# Patient Record
Sex: MALE | Race: WHITE | Employment: UNEMPLOYED | URBAN - METROPOLITAN AREA
[De-identification: names, ages, dates, MRNs, and addresses within clinical notes are randomized per-mention and may not be internally consistent; named-entity substitution may affect disease eponyms.]

---

## 2024-11-22 ENCOUNTER — APPOINTMENT (OUTPATIENT)
Dept: GENERAL RADIOLOGY | Age: 29
End: 2024-11-22

## 2024-11-22 ENCOUNTER — HOSPITAL ENCOUNTER (EMERGENCY)
Age: 29
Discharge: HOME | End: 2024-11-22
Attending: EMERGENCY MEDICINE

## 2024-11-22 VITALS
OXYGEN SATURATION: 98 % | SYSTOLIC BLOOD PRESSURE: 145 MMHG | TEMPERATURE: 97.2 F | HEART RATE: 100 BPM | WEIGHT: 215 LBS | BODY MASS INDEX: 29.99 KG/M2 | RESPIRATION RATE: 16 BRPM | DIASTOLIC BLOOD PRESSURE: 96 MMHG

## 2024-11-22 DIAGNOSIS — R06.02 SHORTNESS OF BREATH: Primary | ICD-10-CM

## 2024-11-22 LAB
ALBUMIN SERPL-MCNC: 5.1 G/DL (ref 3.5–5.2)
ALP SERPL-CCNC: 133 U/L (ref 40–129)
ALT SERPL-CCNC: 49 U/L (ref 0–40)
ANION GAP SERPL CALCULATED.3IONS-SCNC: 12 MMOL/L (ref 7–16)
AST SERPL-CCNC: 31 U/L (ref 0–39)
BASOPHILS # BLD: 0.02 K/UL (ref 0–0.2)
BASOPHILS NFR BLD: 0 % (ref 0–2)
BILIRUB SERPL-MCNC: 0.7 MG/DL (ref 0–1.2)
BUN SERPL-MCNC: 7 MG/DL (ref 6–20)
CALCIUM SERPL-MCNC: 9.6 MG/DL (ref 8.6–10.2)
CHLORIDE SERPL-SCNC: 103 MMOL/L (ref 98–107)
CO2 SERPL-SCNC: 25 MMOL/L (ref 22–29)
CREAT SERPL-MCNC: 0.8 MG/DL (ref 0.7–1.2)
D-DIMER QUANTITATIVE: <200 NG/ML DDU (ref 0–230)
EOSINOPHIL # BLD: 0.04 K/UL (ref 0.05–0.5)
EOSINOPHILS RELATIVE PERCENT: 0 % (ref 0–6)
ERYTHROCYTE [DISTWIDTH] IN BLOOD BY AUTOMATED COUNT: 12 % (ref 11.5–15)
GFR, ESTIMATED: >90 ML/MIN/1.73M2
GLUCOSE SERPL-MCNC: 103 MG/DL (ref 74–99)
HCT VFR BLD AUTO: 46.7 % (ref 37–54)
HGB BLD-MCNC: 16.4 G/DL (ref 12.5–16.5)
IMM GRANULOCYTES # BLD AUTO: 0.08 K/UL (ref 0–0.58)
IMM GRANULOCYTES NFR BLD: 1 % (ref 0–5)
LYMPHOCYTES NFR BLD: 1.45 K/UL (ref 1.5–4)
LYMPHOCYTES RELATIVE PERCENT: 10 % (ref 20–42)
MCH RBC QN AUTO: 29.8 PG (ref 26–35)
MCHC RBC AUTO-ENTMCNC: 35.1 G/DL (ref 32–34.5)
MCV RBC AUTO: 84.8 FL (ref 80–99.9)
MONOCYTES NFR BLD: 0.78 K/UL (ref 0.1–0.95)
MONOCYTES NFR BLD: 5 % (ref 2–12)
NEUTROPHILS NFR BLD: 84 % (ref 43–80)
NEUTS SEG NFR BLD: 12.34 K/UL (ref 1.8–7.3)
PLATELET # BLD AUTO: 286 K/UL (ref 130–450)
PMV BLD AUTO: 10.6 FL (ref 7–12)
POTASSIUM SERPL-SCNC: 4 MMOL/L (ref 3.5–5)
PROT SERPL-MCNC: 7.9 G/DL (ref 6.4–8.3)
RBC # BLD AUTO: 5.51 M/UL (ref 3.8–5.8)
SARS-COV-2 RDRP RESP QL NAA+PROBE: NOT DETECTED
SODIUM SERPL-SCNC: 140 MMOL/L (ref 132–146)
SPECIMEN DESCRIPTION: NORMAL
TROPONIN I SERPL HS-MCNC: <6 NG/L (ref 0–11)
WBC OTHER # BLD: 14.7 K/UL (ref 4.5–11.5)

## 2024-11-22 PROCEDURE — 71046 X-RAY EXAM CHEST 2 VIEWS: CPT

## 2024-11-22 PROCEDURE — 85025 COMPLETE CBC W/AUTO DIFF WBC: CPT

## 2024-11-22 PROCEDURE — 99285 EMERGENCY DEPT VISIT HI MDM: CPT

## 2024-11-22 PROCEDURE — 80053 COMPREHEN METABOLIC PANEL: CPT

## 2024-11-22 PROCEDURE — 84484 ASSAY OF TROPONIN QUANT: CPT

## 2024-11-22 PROCEDURE — 85379 FIBRIN DEGRADATION QUANT: CPT

## 2024-11-22 PROCEDURE — 87635 SARS-COV-2 COVID-19 AMP PRB: CPT

## 2024-11-22 RX ORDER — ALBUTEROL SULFATE 90 UG/1
2 INHALANT RESPIRATORY (INHALATION) 4 TIMES DAILY PRN
Qty: 18 G | Refills: 0 | Status: SHIPPED | OUTPATIENT
Start: 2024-11-22

## 2024-11-22 ASSESSMENT — ENCOUNTER SYMPTOMS
VOMITING: 0
NAUSEA: 0
SHORTNESS OF BREATH: 1
EYE REDNESS: 0
ABDOMINAL PAIN: 0

## 2024-11-22 ASSESSMENT — LIFESTYLE VARIABLES
HOW OFTEN DO YOU HAVE A DRINK CONTAINING ALCOHOL: NEVER
HOW MANY STANDARD DRINKS CONTAINING ALCOHOL DO YOU HAVE ON A TYPICAL DAY: PATIENT DOES NOT DRINK

## 2024-11-23 NOTE — ED PROVIDER NOTES
Medical History:   Diagnosis Date    Pyloric stenosis     had surgery at 5 days old    Pyloric stenosis, congenital            Patient treated with   Medications - No data to display      EKG:  This EKG is signed and interpreted by me.    This is bradycardia rate of 55, no ST segment elevation or depression, OH interval 140 MS, QRS 98 MS, QTc 401 ms      Pt will be d/c and will follow up with his PCP . He is educated on signs and symptoms that require emergent evaluation. Pt is advised to return to the ED if his symptoms change or worsen. If his pain persists, pt may need further evaluation. Pt is agreeable to plan and all questions have been answered at this time.      PATIENT REFERRED TO:  Javy Sterling MD  [address] [facility] [id number] [phone]    Call in 1 day        DISCHARGE MEDICATIONS:  Discharge Medication List as of 11/22/2024  9:27 PM        START taking these medications    Details   albuterol sulfate HFA (VENTOLIN HFA) 108 (90 Base) MCG/ACT inhaler Inhale 2 puffs into the lungs 4 times daily as needed for Wheezing, Disp-18 g, R-0Normal                 EMERGENCY DEPARTMENT COURSE    Vitals:    Vitals:    11/22/24 1527 11/22/24 1537 11/22/24 1910   BP: (!) 145/96     Pulse: 100     Resp: 20  16   Temp: 97.2 °F (36.2 °C)     TempSrc: Infrared     SpO2: 98%  98%   Weight:  97.5 kg (215 lb)        ED Course as of 11/22/24 2350   Fri Nov 22, 2024 2124 The patient reevaluated in no acute distress.  Patient states he feels at his baseline now.  Results discussed.  Patient will discharge follow-up outpatient [MT]      ED Course User Index  [MT] Joelle Bunch DO          I am the Primary Clinician of Record.    FINAL IMPRESSION      1. Shortness of breath          DISPOSITION/PLAN      Decision To Discharge 11/22/2024 09:25:41 PM    Patient's disposition: Discharge to home  Patient's condition is stable.           (Please note that portions of this note were completed with a

## 2024-11-24 LAB
EKG ATRIAL RATE: 55 BPM
EKG P AXIS: 72 DEGREES
EKG P-R INTERVAL: 140 MS
EKG Q-T INTERVAL: 420 MS
EKG QRS DURATION: 98 MS
EKG QTC CALCULATION (BAZETT): 401 MS
EKG R AXIS: 73 DEGREES
EKG T AXIS: 49 DEGREES
EKG VENTRICULAR RATE: 55 BPM
